# Patient Record
Sex: MALE | Race: BLACK OR AFRICAN AMERICAN | NOT HISPANIC OR LATINO | Employment: STUDENT | URBAN - METROPOLITAN AREA
[De-identification: names, ages, dates, MRNs, and addresses within clinical notes are randomized per-mention and may not be internally consistent; named-entity substitution may affect disease eponyms.]

---
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
## 2024-03-14 ENCOUNTER — OFFICE VISIT (OUTPATIENT)
Dept: PEDIATRICS | Facility: CLINIC | Age: 19
End: 2024-03-14
Payer: MEDICAID

## 2024-03-14 VITALS
HEART RATE: 93 BPM | RESPIRATION RATE: 20 BRPM | DIASTOLIC BLOOD PRESSURE: 75 MMHG | HEIGHT: 68 IN | TEMPERATURE: 98 F | OXYGEN SATURATION: 99 % | BODY MASS INDEX: 26.9 KG/M2 | WEIGHT: 177.5 LBS | SYSTOLIC BLOOD PRESSURE: 119 MMHG

## 2024-03-14 DIAGNOSIS — F90.2 ADHD (ATTENTION DEFICIT HYPERACTIVITY DISORDER), COMBINED TYPE: ICD-10-CM

## 2024-03-14 DIAGNOSIS — Z00.00 ENCOUNTER FOR WELL ADULT EXAM WITHOUT ABNORMAL FINDINGS: Primary | ICD-10-CM

## 2024-03-14 DIAGNOSIS — F84.0 AUTISM SPECTRUM DISORDER: ICD-10-CM

## 2024-03-14 DIAGNOSIS — G47.00 PERSISTENT DISORDER OF INITIATING OR MAINTAINING SLEEP: ICD-10-CM

## 2024-03-14 DIAGNOSIS — H10.10 ALLERGIC CONJUNCTIVITIS, UNSPECIFIED LATERALITY: ICD-10-CM

## 2024-03-14 DIAGNOSIS — J30.2 SEASONAL ALLERGIC RHINITIS, UNSPECIFIED TRIGGER: ICD-10-CM

## 2024-03-14 PROCEDURE — 3074F SYST BP LT 130 MM HG: CPT | Mod: CPTII,,, | Performed by: NURSE PRACTITIONER

## 2024-03-14 PROCEDURE — 1159F MED LIST DOCD IN RCRD: CPT | Mod: CPTII,,, | Performed by: NURSE PRACTITIONER

## 2024-03-14 PROCEDURE — 3008F BODY MASS INDEX DOCD: CPT | Mod: CPTII,,, | Performed by: NURSE PRACTITIONER

## 2024-03-14 PROCEDURE — 3078F DIAST BP <80 MM HG: CPT | Mod: CPTII,,, | Performed by: NURSE PRACTITIONER

## 2024-03-14 PROCEDURE — 99395 PREV VISIT EST AGE 18-39: CPT | Mod: 25,S$PBB,, | Performed by: NURSE PRACTITIONER

## 2024-03-14 PROCEDURE — 99213 OFFICE O/P EST LOW 20 MIN: CPT | Mod: PBBFAC,PN | Performed by: NURSE PRACTITIONER

## 2024-03-14 RX ORDER — DEXTROAMPHETAMINE SACCHARATE, AMPHETAMINE ASPARTATE, DEXTROAMPHETAMINE SULFATE AND AMPHETAMINE SULFATE 7.5; 7.5; 7.5; 7.5 MG/1; MG/1; MG/1; MG/1
30 TABLET ORAL 2 TIMES DAILY
Qty: 60 TABLET | Refills: 0 | Status: SHIPPED | OUTPATIENT
Start: 2024-03-14 | End: 2024-06-13 | Stop reason: SDUPTHER

## 2024-03-14 RX ORDER — LISDEXAMFETAMINE DIMESYLATE 70 MG/1
70 CAPSULE ORAL EVERY MORNING
Qty: 30 CAPSULE | Refills: 0 | Status: SHIPPED | OUTPATIENT
Start: 2024-03-14 | End: 2024-06-13 | Stop reason: SDUPTHER

## 2024-03-14 RX ORDER — CLONIDINE HYDROCHLORIDE 0.2 MG/1
TABLET ORAL
Qty: 60 TABLET | Refills: 5 | Status: SHIPPED | OUTPATIENT
Start: 2024-03-14 | End: 2024-06-13 | Stop reason: SDUPTHER

## 2024-03-14 RX ORDER — OLOPATADINE HYDROCHLORIDE 1 MG/ML
1 SOLUTION/ DROPS OPHTHALMIC 2 TIMES DAILY PRN
Qty: 5 ML | Refills: 2 | Status: SHIPPED | OUTPATIENT
Start: 2024-03-14 | End: 2024-06-13 | Stop reason: SDUPTHER

## 2024-03-14 RX ORDER — DEXTROAMPHETAMINE SACCHARATE, AMPHETAMINE ASPARTATE, DEXTROAMPHETAMINE SULFATE AND AMPHETAMINE SULFATE 7.5; 7.5; 7.5; 7.5 MG/1; MG/1; MG/1; MG/1
30 TABLET ORAL DAILY
Qty: 30 TABLET | Refills: 0 | Status: SHIPPED | OUTPATIENT
Start: 2024-03-14 | End: 2024-06-13 | Stop reason: SDUPTHER

## 2024-03-14 RX ORDER — CLONAZEPAM 1 MG/1
1.5 TABLET ORAL NIGHTLY
Qty: 45 TABLET | Refills: 2 | Status: SHIPPED | OUTPATIENT
Start: 2024-03-14 | End: 2024-06-13 | Stop reason: SDUPTHER

## 2024-03-14 RX ORDER — FLUTICASONE PROPIONATE 50 MCG
1 SPRAY, SUSPENSION (ML) NASAL DAILY
Qty: 16 G | Refills: 2 | Status: SHIPPED | OUTPATIENT
Start: 2024-03-14 | End: 2024-06-13 | Stop reason: SDUPTHER

## 2024-03-14 NOTE — PROGRESS NOTES
Chief Complaint   Patient presents with    Cough     X 3days    Follow-up     Present with Mom. Here for 3mo f/u check-up. Need some med refills.           HPI:  Carley is here with his mother for routine follow up Autism, ADHD and insomnia. He is due for his annual wellness check  Any changes last visit? no    Concerns today? Coughing x 3 days. Has been spitting out a lot of mucous. Has itchy eyes. No fevers. Continues to be active and eat well  The allergen outlook today:   EXTREMELY HIGH: dust and dander  HIGH in tree pollen and mold  MODERATE: grass pollen    Discussed use Flonase and Cetirizine daily during the spring allergy season. Added Olopatadine eye drops for allergy eye symptoms    Educational setting:  in 12th grade at [employer]  Parents have option of keeping him in school an extra year  Accommodations? yes, has IEP and 504. Self contained autism class and attend M-F  Academics/grades: doing well    Conduct at school: good  Conduct at home: good     Are current medications working well? yes  How long do the medicines last during the day? Sufficient duration  Are medications are being taken regularly according to parent? yes     Last fill dates for Vyvanse and Adderall: 2/8/24    Activity level: can be very active, and his impulsive activity is improved with use of Vyvanse and Adderall  Plays outside when the weather is nice, otherwise stays in.      Rehabilitation services: [facility]  Self help skills: feeds himself, occasionally dresses himself. No problems with getting ready for school.  Brushes his own teeth  Toilet training: yes     Diet/ Appetite: wonderful, eats anything and everything    Sleep: sleeping well; good response to Clonidine and Clonazepam.    Mood: Happy. No irritability or aggression     Brushes teeth: 2 times/day - Carley LOVES to brush his teeth and will use anyone's tooth brush. He will squeeze toothpaste into his mouth and his mother has to hide it  Sees dentist regularly?  "Yes. Sometimes he has to get some sedation to allow examination     Any vision/eye problems? no    Safety:  Wears seat belt/stays in car seat every time rides in car? yes  Does family have/practice fire escape plan, smoke detectors? yes         Reviewed and no changes:  Communication: Can use some words to express needs  Self injurious behavior: None. When he was younger, he would bite his hands if he is angry or frustrated, but hasn't done that in a long time    Aggression: none recently  Can be encinas; will slam his bedroom door several times if he is mad, so that everyone knows he is mad    Tantrums: no  Elopement problems: May leave his mother while shopping to go to restroom in store, but isn't running off    Ambulation: no problems    Rehabilitation services (OT,PT,ST, APE) Continues to receive ST, APE, OT at school    Recent hospitalization? no    Impaired vision? no  Impaired hearing? no    Assistive technology:   HC Plate yes  Wheel chair: no  Lift: no  Bath chair:no    Self Help skills:   Feeding: feeds himself  Bathing - yes, needs help/full assistance  Oral hygiene - yes, needs help/full assistance  Toileting/incontinence? needs partial assist with wiping. Mother says this hasn't changed    Toilet training: yes  Constipation: no     Meds tried:  Sertraline, Lexapro - no improvement  Fluoxetine - mother doesn't remember  Venlafaxine - possible sleep issues  Seroquel - for sleep, no benefit  Olanzapine - no improvement     Review of Systems   Gen: No fever, fatigue or malaise  Eyes: Red, itchy eyes  Nose: Nasal congestion and mucous  Mouth: No sore throat  Resp: No cough or wheezing  GI: No stomach aches  Skin: No rashes  Neuro: No headaches    Vitals:    03/14/24 0919   BP: 119/75   BP Location: Left arm   Patient Position: Sitting   BP Method: Medium (Automatic)   Pulse: 93   Resp: 20   Temp: 97.9 °F (36.6 °C)   TempSrc: Temporal   SpO2: 99%   Weight: 80.5 kg (177 lb 7.5 oz)   Height: 5' 7.72" (1.72 m) "         Physical Exam  General: Alert, social and cooperative. No verbalizations. Appears to be very happy and active.  Skin: Warm, dry, no rash.  Eye: Pupils are equal, round and reactive to light. Bilateral conjunctiva erythematous, worse on left. No discharge.  Ears: Bilateral TMs clear.  Nose: Turbinates very boggy. Moderate clear nasal discharge.  Mouth and throat: Oral mucosa moist, no pharyngeal erythema or exudate. Posterior cobblestoning  Neck: Supple. No lymphadenopathy.  Respiratory: Lungs are clear to auscultation, breath sounds are equal  Cardiovascular: Regular rate and rhythm. No murmur.  Gastrointestinal: Soft, non-tender, normal bowel sounds.  Genitourinary: Deferred  Back: Normal alignment.  Musculoskeletal: Moves all extremities. Normal strength  Neurologic: Alert, no focal neurological deficit observed. Normal and symmetrical reflexes observed.  Developmental: Healthy happy autistic young adult  BMI: 27.2    Assessment/Plan:  Encounter for well adult exam without abnormal findings  Comments:  Healthy, well nourished autistic young adult    ADHD (attention deficit hyperactivity disorder), combined type  Comments:  Good response to Vyvanse and Adderall IR  Orders:  -     lisdexamfetamine (VYVANSE) 70 MG capsule; Take 1 capsule (70 mg total) by mouth every morning. Fill in May  Dispense: 30 capsule; Refill: 0  -     lisdexamfetamine (VYVANSE) 70 MG capsule; Take 1 capsule (70 mg total) by mouth every morning. Fill in April  Dispense: 30 capsule; Refill: 0  -     VYVANSE 70 mg capsule; Take 1 capsule (70 mg total) by mouth every morning. Fill in March  Dispense: 30 capsule; Refill: 0  -     dextroamphetamine-amphetamine (ADDERALL) 30 mg Tab; Take 1 tablet (30 mg total) by mouth 2 (two) times daily. Take in AM and at noon/lunch. Fill in May  Dispense: 60 tablet; Refill: 0  -     dextroamphetamine-amphetamine (ADDERALL) 30 mg Tab; Take 1 tablet (30 mg total) by mouth 2 (two) times daily. Take in AM and  at noon/lunch. Fill in April  Dispense: 60 tablet; Refill: 0  -     dextroamphetamine-amphetamine (ADDERALL) 30 mg Tab; Take 1 tablet (30 mg total) by mouth 2 (two) times daily. Take in AM and at noon/lunch. Fill in March  Dispense: 60 tablet; Refill: 0  -     dextroamphetamine-amphetamine (ADDERALL) 30 mg Tab; Take 1 tablet (30 mg total) by mouth Daily. Take in afternoon. Family will pay, do not run insurance. Fill in May  Dispense: 30 tablet; Refill: 0  -     dextroamphetamine-amphetamine (ADDERALL) 30 mg Tab; Take 1 tablet (30 mg total) by mouth Daily. Take in afternoon. Family will pay, do not run insurance. Fill in April  Dispense: 30 tablet; Refill: 0  -     dextroamphetamine-amphetamine (ADDERALL) 30 mg Tab; Take 1 tablet (30 mg total) by mouth Daily. Take in afternoon. Do not run insurance, family will pay. Fill in March  Dispense: 30 tablet; Refill: 0  -     cloNIDine (CATAPRES) 0.2 MG tablet; TAKE 1 TO 2 TABLETS BY MOUTH AT BEDTIME AS NEEDED FOR SLEEP  Dispense: 60 tablet; Refill: 5    Autism spectrum disorder  Comments:  In SPED class    Persistent disorder of initiating or maintaining sleep  Comments:  Good response to Clonidine and Clonazepam  Orders:  -     cloNIDine (CATAPRES) 0.2 MG tablet; TAKE 1 TO 2 TABLETS BY MOUTH AT BEDTIME AS NEEDED FOR SLEEP  Dispense: 60 tablet; Refill: 5  -     clonazePAM (KLONOPIN) 1 MG tablet; Take 1.5 tablets (1.5 mg total) by mouth every evening. For sleep. May take with Clonidine  Dispense: 45 tablet; Refill: 2    Seasonal allergic rhinitis, unspecified trigger  Comments:  Good response to Cetirizine and Flonase.  Orders:  -     fluticasone propionate (FLONASE) 50 mcg/actuation nasal spray; 1 spray (50 mcg total) by Each Nostril route Daily. For stuffy or runny nose  Dispense: 16 g; Refill: 2    Allergic conjunctivitis, unspecified laterality  Comments:  Added Olopatadine eye drops  Orders:  -     olopatadine (PATANOL) 0.1 % ophthalmic solution; Place 1 drop into both  eyes 2 (two) times daily as needed (For red, itchy eyes).  Dispense: 5 mL; Refill: 2      Added Olopatadine eye drops as needed for red, itchy eyes from allergies  Continue all other medications as directed. 3 months refills sent for ADHD meds. Use allergy meds daily during spring allergy season  Follow up 3 months

## 2024-03-14 NOTE — LETTER
March 14, 2024    Carley Rodgers  [address]  [facility] [address]             [facility]  Pediatrics  [address]  [facility] [address]  Phone: [phone]  Fax: [phone]   March 14, 2024     Patient: Carley Rodgers   YOB: 2005   Date of Visit: 3/14/2024       To Whom it May Concern:    Please excuse Carley from school today for clinic visit.    If you have any questions or concerns, please don't hesitate to call.    Sincerely,         Carol Vaca, ISABELLEP

## 2024-03-14 NOTE — PATIENT INSTRUCTIONS
Added Olopatadine eye drops as needed for red, itchy eyes from allergies    Continue all other medications as directed    Follow up 3 months

## 2024-06-13 ENCOUNTER — OFFICE VISIT (OUTPATIENT)
Dept: PEDIATRICS | Facility: CLINIC | Age: 19
End: 2024-06-13
Payer: MEDICAID

## 2024-06-13 VITALS
SYSTOLIC BLOOD PRESSURE: 127 MMHG | BODY MASS INDEX: 27.26 KG/M2 | WEIGHT: 179.88 LBS | HEIGHT: 68 IN | RESPIRATION RATE: 20 BRPM | HEART RATE: 98 BPM | DIASTOLIC BLOOD PRESSURE: 85 MMHG | TEMPERATURE: 98 F | OXYGEN SATURATION: 99 %

## 2024-06-13 DIAGNOSIS — J30.2 SEASONAL ALLERGIC RHINITIS, UNSPECIFIED TRIGGER: ICD-10-CM

## 2024-06-13 DIAGNOSIS — F90.2 ADHD (ATTENTION DEFICIT HYPERACTIVITY DISORDER), COMBINED TYPE: Primary | ICD-10-CM

## 2024-06-13 DIAGNOSIS — H10.10 ALLERGIC CONJUNCTIVITIS, UNSPECIFIED LATERALITY: ICD-10-CM

## 2024-06-13 DIAGNOSIS — G47.00 PERSISTENT DISORDER OF INITIATING OR MAINTAINING SLEEP: ICD-10-CM

## 2024-06-13 PROCEDURE — 3074F SYST BP LT 130 MM HG: CPT | Mod: CPTII,,, | Performed by: NURSE PRACTITIONER

## 2024-06-13 PROCEDURE — 99213 OFFICE O/P EST LOW 20 MIN: CPT | Mod: S$PBB,,, | Performed by: NURSE PRACTITIONER

## 2024-06-13 PROCEDURE — 3079F DIAST BP 80-89 MM HG: CPT | Mod: CPTII,,, | Performed by: NURSE PRACTITIONER

## 2024-06-13 PROCEDURE — 3008F BODY MASS INDEX DOCD: CPT | Mod: CPTII,,, | Performed by: NURSE PRACTITIONER

## 2024-06-13 PROCEDURE — 99214 OFFICE O/P EST MOD 30 MIN: CPT | Mod: PBBFAC,PN | Performed by: NURSE PRACTITIONER

## 2024-06-13 PROCEDURE — 1159F MED LIST DOCD IN RCRD: CPT | Mod: CPTII,,, | Performed by: NURSE PRACTITIONER

## 2024-06-13 RX ORDER — DEXTROAMPHETAMINE SACCHARATE, AMPHETAMINE ASPARTATE, DEXTROAMPHETAMINE SULFATE AND AMPHETAMINE SULFATE 7.5; 7.5; 7.5; 7.5 MG/1; MG/1; MG/1; MG/1
30 TABLET ORAL 2 TIMES DAILY
Qty: 60 TABLET | Refills: 0 | Status: SHIPPED | OUTPATIENT
Start: 2024-06-13

## 2024-06-13 RX ORDER — CLONIDINE HYDROCHLORIDE 0.2 MG/1
TABLET ORAL
Qty: 60 TABLET | Refills: 5 | Status: SHIPPED | OUTPATIENT
Start: 2024-06-13

## 2024-06-13 RX ORDER — LISDEXAMFETAMINE DIMESYLATE 70 MG/1
70 CAPSULE ORAL EVERY MORNING
Qty: 30 CAPSULE | Refills: 0 | Status: SHIPPED | OUTPATIENT
Start: 2024-06-13

## 2024-06-13 RX ORDER — CETIRIZINE HYDROCHLORIDE 10 MG/1
10 TABLET ORAL DAILY
Qty: 30 TABLET | Refills: 5 | Status: SHIPPED | OUTPATIENT
Start: 2024-06-13

## 2024-06-13 RX ORDER — CLONAZEPAM 1 MG/1
1.5 TABLET ORAL NIGHTLY
Qty: 45 TABLET | Refills: 2 | Status: SHIPPED | OUTPATIENT
Start: 2024-06-13

## 2024-06-13 RX ORDER — DEXTROAMPHETAMINE SACCHARATE, AMPHETAMINE ASPARTATE, DEXTROAMPHETAMINE SULFATE AND AMPHETAMINE SULFATE 7.5; 7.5; 7.5; 7.5 MG/1; MG/1; MG/1; MG/1
30 TABLET ORAL DAILY
Qty: 30 TABLET | Refills: 0 | Status: SHIPPED | OUTPATIENT
Start: 2024-06-13

## 2024-06-13 RX ORDER — OLOPATADINE HYDROCHLORIDE 1 MG/ML
1 SOLUTION/ DROPS OPHTHALMIC 2 TIMES DAILY PRN
Qty: 5 ML | Refills: 2 | Status: SHIPPED | OUTPATIENT
Start: 2024-06-13

## 2024-06-13 RX ORDER — FLUTICASONE PROPIONATE 50 MCG
1 SPRAY, SUSPENSION (ML) NASAL DAILY
Qty: 16 G | Refills: 2 | Status: SHIPPED | OUTPATIENT
Start: 2024-06-13

## 2024-06-13 NOTE — PROGRESS NOTES
Chief Complaint   Patient presents with    3mo f/u for ADHD     Present with Mom. UTD vaccines. Need med refills.       HPI:  Carley is here with his mother for routine follow up Autism, ADHD and insomnia.   Any changes last visit? no    Interim history:  Graduated from high school! He attended his graduation though they didn't stay long as there were many people there  Mother is looking for an educational work type program to keep him occupied now that school is over    Educational setting:  graduated 12th grade at [employer]  Mother will be contacting [employer] [employer] about programs    Conduct at home: good      Are current medications working well? yes  How long do the medicines last during the day? Sufficient duration  Are medications are being taken regularly according to parent? yes      Last fill dates for Vyvanse and Adderall: 5/10/24     Activity level: can be very active, and his impulsive activity is improved with use of Vyvanse and Adderall  Plays outside when the weather is nice, otherwise stays in.      Rehabilitation services: [facility]  Self help skills: feeds himself, occasionally dresses himself. No problems with getting ready for school.  Brushes his own teeth  Toilet training: yes     Diet/ Appetite: wonderful, eats anything and everything     Sleep: sleeping well; good response to Clonidine and Clonazepam.     Mood: Happy. No irritability or aggression     Brushes teeth: 2 times/day - Carley LOVES to brush his teeth and will use anyone's tooth brush. He will squeeze toothpaste into his mouth and his mother has to hide it  Sees dentist regularly? Yes. Sometimes he has to get some sedation to allow examination     Any vision/eye problems? no     Safety:  Wears seat belt/stays in car seat every time rides in car? yes  Does family have/practice fire escape plan, smoke detectors? yes         Reviewed and no changes:  Communication: Can use some words to express needs  Self injurious behavior: None. When he was  "younger, he would bite his hands if he is angry or frustrated, but hasn't done that in a long time    Aggression: none recently  Can be encinas; will slam his bedroom door several times if he is mad, so that everyone knows he is mad    Tantrums: no  Elopement problems: May leave his mother while shopping to go to restroom in store, but isn't running off    Ambulation: no problems    Rehabilitation services (OT,PT,ST, APE) Continues to receive ST, APE, OT at school    Recent hospitalization? no    Impaired vision? no  Impaired hearing? no    Assistive technology:   HC Plate yes  Wheel chair: no  Lift: no  Bath chair:no    Self Help skills:   Feeding: feeds himself  Bathing - yes, needs help/full assistance  Oral hygiene - yes, needs help/full assistance  Toileting/incontinence? needs partial assist with wiping. Mother says this hasn't changed    Toilet training: yes  Constipation: no     Meds tried:  Sertraline, Lexapro - no improvement  Fluoxetine - mother doesn't remember  Venlafaxine - possible sleep issues  Seroquel - for sleep, no benefit  Olanzapine - no improvement    Review of Systems   Gen: No fever, fatigue or malaise  Nose: No nasal congestion  Mouth: No sore throat  Resp: No cough or wheezing  CVS: No chest pain or palpitations  GI: No stomach aches  Neuro: No headaches    Vitals:    06/13/24 0849   BP: 127/85   Pulse: 98   Resp: 20   Temp: 97.7 °F (36.5 °C)   SpO2: 99%   Weight: 81.6 kg (179 lb 14.3 oz)   Height: 5' 7.52" (1.715 m)     Physical Exam:  General: Alert, happy and cooperative. Smiling. No verbalizations today  Skin: Warm, dry, no rash  Eye: Pupils are equal, round and reactive to light. Normal conjunctiva, no discharge.  Nose: No nasal discharge.  Mouth and throat: Oral mucosa moist. No pharyngeal erythema or exudate.  Respiratory: Lungs are clear to auscultation, breath sounds are equal  Cardiovascular: Regular rate and rhythm. No murmur.  Gastrointestinal: Abd soft, non tender. Normal bowel " sounds  Neurologic: Alert, no focal neurological deficit observed.    Assessment/Plan:  ADHD (attention deficit hyperactivity disorder), combined type  Comments:  Good response to Vyvanse and Adderall IR  Orders:  -     dextroamphetamine-amphetamine (ADDERALL) 30 mg Tab; Take 1 tablet (30 mg total) by mouth 2 (two) times daily. Take in AM and at noon/lunch. Fill in August  Dispense: 60 tablet; Refill: 0  -     dextroamphetamine-amphetamine (ADDERALL) 30 mg Tab; Take 1 tablet (30 mg total) by mouth 2 (two) times daily. Take in AM and at noon/lunch. Fill in July  Dispense: 60 tablet; Refill: 0  -     dextroamphetamine-amphetamine (ADDERALL) 30 mg Tab; Take 1 tablet (30 mg total) by mouth 2 (two) times daily. Take in AM and at noon/lunch. Fill in June  Dispense: 60 tablet; Refill: 0  -     dextroamphetamine-amphetamine (ADDERALL) 30 mg Tab; Take 1 tablet (30 mg total) by mouth Daily. Take in afternoon. Family will pay, do not run insurance. Fill in August  Dispense: 30 tablet; Refill: 0  -     dextroamphetamine-amphetamine (ADDERALL) 30 mg Tab; Take 1 tablet (30 mg total) by mouth Daily. Take in afternoon. Family will pay, do not run insurance. Fill in July  Dispense: 30 tablet; Refill: 0  -     dextroamphetamine-amphetamine (ADDERALL) 30 mg Tab; Take 1 tablet (30 mg total) by mouth Daily. Take in afternoon. Do not run insurance, family will pay. Fill in June  Dispense: 30 tablet; Refill: 0  -     lisdexamfetamine (VYVANSE) 70 MG capsule; Take 1 capsule (70 mg total) by mouth every morning. Fill in August  Dispense: 30 capsule; Refill: 0  -     lisdexamfetamine (VYVANSE) 70 MG capsule; Take 1 capsule (70 mg total) by mouth every morning. Fill in July  Dispense: 30 capsule; Refill: 0  -     VYVANSE 70 mg capsule; Take 1 capsule (70 mg total) by mouth every morning. Fill in June  Dispense: 30 capsule; Refill: 0  -     cloNIDine (CATAPRES) 0.2 MG tablet; TAKE 1 TO 2 TABLETS BY MOUTH AT BEDTIME AS NEEDED FOR SLEEP   Dispense: 60 tablet; Refill: 5    Persistent disorder of initiating or maintaining sleep  Comments:  Good response to Clonidine and Clonazepam  Orders:  -     cloNIDine (CATAPRES) 0.2 MG tablet; TAKE 1 TO 2 TABLETS BY MOUTH AT BEDTIME AS NEEDED FOR SLEEP  Dispense: 60 tablet; Refill: 5  -     clonazePAM (KLONOPIN) 1 MG tablet; Take 1.5 tablets (1.5 mg total) by mouth every evening. For sleep. May take with Clonidine  Dispense: 45 tablet; Refill: 2    Allergic conjunctivitis, unspecified laterality  Comments:  Added Olopatadine eye drops  Orders:  -     olopatadine (PATANOL) 0.1 % ophthalmic solution; Place 1 drop into both eyes 2 (two) times daily as needed (For red, itchy eyes).  Dispense: 5 mL; Refill: 2    Seasonal allergic rhinitis, unspecified trigger  Comments:  Good response to Cetirizine and Flonase.  Orders:  -     cetirizine (ZYRTEC) 10 MG tablet; Take 1 tablet (10 mg total) by mouth once daily. For allergy symptoms  Dispense: 30 tablet; Refill: 5  -     fluticasone propionate (FLONASE) 50 mcg/actuation nasal spray; 1 spray (50 mcg total) by Each Nostril route Daily. For stuffy or runny nose  Dispense: 16 g; Refill: 2    Seasonal allergic rhinitis, unspecified trigger  Comments:  Duplicate dx  Orders:  -     cetirizine (ZYRTEC) 10 MG tablet; Take 1 tablet (10 mg total) by mouth once daily. For allergy symptoms  Dispense: 30 tablet; Refill: 5  -     fluticasone propionate (FLONASE) 50 mcg/actuation nasal spray; 1 spray (50 mcg total) by Each Nostril route Daily. For stuffy or runny nose  Dispense: 16 g; Refill: 2    Continue current medications as directed  Follow up 3 months

## 2024-07-12 ENCOUNTER — TELEPHONE (OUTPATIENT)
Dept: PEDIATRICS | Facility: CLINIC | Age: 19
End: 2024-07-12
Payer: MEDICAID

## 2024-07-12 DIAGNOSIS — F90.2 ADHD (ATTENTION DEFICIT HYPERACTIVITY DISORDER), COMBINED TYPE: ICD-10-CM

## 2024-07-12 RX ORDER — DEXTROAMPHETAMINE SACCHARATE, AMPHETAMINE ASPARTATE, DEXTROAMPHETAMINE SULFATE AND AMPHETAMINE SULFATE 7.5; 7.5; 7.5; 7.5 MG/1; MG/1; MG/1; MG/1
30 TABLET ORAL 2 TIMES DAILY
Qty: 60 TABLET | Refills: 0 | Status: SHIPPED | OUTPATIENT
Start: 2024-07-12

## 2024-07-16 ENCOUNTER — TELEPHONE (OUTPATIENT)
Dept: PEDIATRICS | Facility: CLINIC | Age: 19
End: 2024-07-16
Payer: MEDICAID

## 2024-07-16 DIAGNOSIS — F90.2 ADHD (ATTENTION DEFICIT HYPERACTIVITY DISORDER), COMBINED TYPE: ICD-10-CM

## 2024-07-17 RX ORDER — LISDEXAMFETAMINE DIMESYLATE 70 MG/1
70 CAPSULE ORAL EVERY MORNING
Qty: 30 CAPSULE | Refills: 0 | Status: SHIPPED | OUTPATIENT
Start: 2024-07-17

## 2024-07-17 NOTE — TELEPHONE ENCOUNTER
"Carol, I call [facility] and they are requesting another script for the Vyvanse.  States it needs to have "brand name medically necessary"  "

## 2024-08-28 ENCOUNTER — TELEPHONE (OUTPATIENT)
Dept: PEDIATRICS | Facility: CLINIC | Age: 19
End: 2024-08-28
Payer: MEDICAID

## 2024-08-28 DIAGNOSIS — F90.2 ADHD (ATTENTION DEFICIT HYPERACTIVITY DISORDER), COMBINED TYPE: ICD-10-CM

## 2024-08-29 RX ORDER — LISDEXAMFETAMINE DIMESYLATE 70 MG/1
70 CAPSULE ORAL EVERY MORNING
Qty: 30 CAPSULE | Refills: 0 | Status: SHIPPED | OUTPATIENT
Start: 2024-08-29

## 2024-09-12 ENCOUNTER — TELEPHONE (OUTPATIENT)
Dept: PEDIATRICS | Facility: CLINIC | Age: 19
End: 2024-09-12
Payer: MEDICAID

## 2024-09-12 DIAGNOSIS — H10.13 ALLERGIC CONJUNCTIVITIS OF BOTH EYES: Primary | ICD-10-CM

## 2024-09-12 RX ORDER — AZELASTINE HYDROCHLORIDE 0.5 MG/ML
1 SOLUTION/ DROPS OPHTHALMIC 2 TIMES DAILY PRN
Qty: 6 ML | Refills: 1 | Status: SHIPPED | OUTPATIENT
Start: 2024-09-12

## 2024-09-12 NOTE — TELEPHONE ENCOUNTER
Carol, the Patanol is getting rejected at the pharmacy.  I tried submitting a PA for brand and generic and the message I'm getting is PA Case ID #: [id number]  Need Help? Call us at [phone]  Outcome  Additional Information Required  OLOPATADINE HCL 0.1 % DROPS is not covered for this Beneficiary. For covered alternatives, please view the preferred drug list at [email or web address] or call us at [phone].

## 2024-09-12 NOTE — TELEPHONE ENCOUNTER
This has come up several times and I have been sending in Azelastine eye drops in the place of Patanol. I sent in a prescription for Carley

## 2024-09-13 ENCOUNTER — OFFICE VISIT (OUTPATIENT)
Dept: PEDIATRICS | Facility: CLINIC | Age: 19
End: 2024-09-13
Payer: MEDICAID

## 2024-09-13 VITALS
SYSTOLIC BLOOD PRESSURE: 122 MMHG | DIASTOLIC BLOOD PRESSURE: 76 MMHG | TEMPERATURE: 98 F | BODY MASS INDEX: 28.14 KG/M2 | WEIGHT: 190 LBS | HEART RATE: 68 BPM | RESPIRATION RATE: 18 BRPM | HEIGHT: 69 IN

## 2024-09-13 DIAGNOSIS — J30.2 SEASONAL ALLERGIC RHINITIS, UNSPECIFIED TRIGGER: ICD-10-CM

## 2024-09-13 DIAGNOSIS — Z23 IMMUNIZATION DUE: ICD-10-CM

## 2024-09-13 DIAGNOSIS — G47.00 PERSISTENT DISORDER OF INITIATING OR MAINTAINING SLEEP: ICD-10-CM

## 2024-09-13 DIAGNOSIS — F90.2 ADHD (ATTENTION DEFICIT HYPERACTIVITY DISORDER), COMBINED TYPE: Primary | ICD-10-CM

## 2024-09-13 PROCEDURE — 99213 OFFICE O/P EST LOW 20 MIN: CPT | Mod: PBBFAC,PN | Performed by: NURSE PRACTITIONER

## 2024-09-13 RX ORDER — DEXTROAMPHETAMINE SACCHARATE, AMPHETAMINE ASPARTATE, DEXTROAMPHETAMINE SULFATE AND AMPHETAMINE SULFATE 7.5; 7.5; 7.5; 7.5 MG/1; MG/1; MG/1; MG/1
30 TABLET ORAL 2 TIMES DAILY
Qty: 60 TABLET | Refills: 0 | Status: SHIPPED | OUTPATIENT
Start: 2024-09-13

## 2024-09-13 RX ORDER — LISDEXAMFETAMINE DIMESYLATE 70 MG/1
70 CAPSULE ORAL EVERY MORNING
Qty: 30 CAPSULE | Refills: 0 | Status: SHIPPED | OUTPATIENT
Start: 2024-09-13

## 2024-09-13 RX ORDER — FLUTICASONE PROPIONATE 50 MCG
1 SPRAY, SUSPENSION (ML) NASAL DAILY
Qty: 16 G | Refills: 2 | Status: SHIPPED | OUTPATIENT
Start: 2024-09-13

## 2024-09-13 RX ORDER — CLONIDINE HYDROCHLORIDE 0.2 MG/1
TABLET ORAL
Qty: 60 TABLET | Refills: 5 | Status: SHIPPED | OUTPATIENT
Start: 2024-09-13

## 2024-09-13 RX ORDER — DEXTROAMPHETAMINE SACCHARATE, AMPHETAMINE ASPARTATE, DEXTROAMPHETAMINE SULFATE AND AMPHETAMINE SULFATE 7.5; 7.5; 7.5; 7.5 MG/1; MG/1; MG/1; MG/1
30 TABLET ORAL DAILY
Qty: 30 TABLET | Refills: 0 | Status: SHIPPED | OUTPATIENT
Start: 2024-09-13

## 2024-09-13 RX ORDER — CLONAZEPAM 1 MG/1
1.5 TABLET ORAL NIGHTLY
Qty: 45 TABLET | Refills: 2 | Status: SHIPPED | OUTPATIENT
Start: 2024-09-13

## 2024-09-13 RX ORDER — CETIRIZINE HYDROCHLORIDE 10 MG/1
10 TABLET ORAL DAILY
Qty: 30 TABLET | Refills: 5 | Status: SHIPPED | OUTPATIENT
Start: 2024-09-13

## 2024-09-13 NOTE — PROGRESS NOTES
Chief Complaint   Patient presents with    Follow-up     Here for follow up for Autism, ADHD and several other disorders. Mom has no concern at this time. Needs flu vaccine     HPI:  Carley is here with his mother and grandmother for routine follow up Autism, ADHD and insomnia.   Any changes last visit? no    Interim history:  Will start with [facility] in October. Mother may bring him or he can take the bus.     Educational setting:  graduated from [employer]     Conduct at home: good      Are current medications working well? yes  How long do the medicines last during the day? Sufficient duration  Are medications are being taken regularly according to parent? yes      Last fill for Vyvanse: 8/29/24   Last fill for Adderall: 8/13/24     Activity level: can be very active, and his impulsive activity is improved with use of Vyvanse and Adderall  Plays outside when the weather is nice, otherwise stays in.      Self help skills: feeds himself, occasionally dresses himself. No problems with getting ready for school.  Brushes his own teeth  Toilet training: yes     Diet/ Appetite: wonderful, eats anything and everything     Sleep: sleeping well; good response to Clonidine and Clonazepam.     Mood: Happy. No irritability or aggression     Brushes teeth: 2 times/day - Carley LOVES to brush his teeth and will use anyone's tooth brush. He will squeeze toothpaste into his mouth and his mother has to hide it  Sees dentist regularly? Yes. Sometimes he has to get some sedation to allow examination     Any vision/eye problems? no     Safety:  Wears seat belt/stays in car seat every time rides in car? yes  Does family have/practice fire escape plan, smoke detectors? yes         Reviewed and no changes:  Communication: Can use some words to express needs  Self injurious behavior: None. When he was younger, he would bite his hands if he is angry or frustrated, but hasn't done that in a long time    Aggression: none recently  Can be  "encinas; will slam his bedroom door several times if he is mad, so that everyone knows he is mad    Tantrums: no  Elopement problems: May leave his mother while shopping to go to restroom in store, but isn't running off    Ambulation: no problems    Rehabilitation services (OT,PT,ST, APE): no    Recent hospitalization? no    Impaired vision? no  Impaired hearing? no    Assistive technology:   HC Plate yes  Wheel chair: no  Lift: no  Bath chair:no    Self Help skills:   Feeding: feeds himself  Bathing - yes, needs help/full assistance  Oral hygiene - yes, needs help/full assistance  Toileting/incontinence? needs partial assist with wiping. Mother says this hasn't changed    Toilet training: yes  Constipation: no     Meds tried:  Sertraline, Lexapro - no improvement  Fluoxetine - mother doesn't remember  Venlafaxine - possible sleep issues  Seroquel - for sleep, no benefit  Olanzapine - no improvement    Review of Systems   Gen: No fever, fatigue or malaise  Nose: No nasal congestion  Mouth: No sore throat  Resp: No cough or wheezing  CVS: No chest pain or palpitations  GI: No stomach aches  Neuro: No headaches    Vitals:    09/13/24 0921   BP: 122/76   Pulse: 68   Resp: 18   Temp: 97.7 °F (36.5 °C)   Weight: 86.2 kg (190 lb)   Height: 5' 9.29" (1.76 m)     Physical Exam:  General: Alert, appropriate for age. Pleasant and cooperative. Listens to his mother's instructions. Loves to wash his hands and he stopped when his mother told him to  Skin: Warm, dry, no rash  Eye: Pupils are equal, round and reactive to light. Normal conjunctiva, no discharge.  Ears: Bilateral TMs clear  Nose: No nasal discharge.  Mouth and throat: Oral mucosa moist. No pharyngeal erythema or exudate.  Respiratory: Lungs are clear to auscultation, breath sounds are equal  Cardiovascular: Regular rate and rhythm. No murmur.  Gastrointestinal: Abd soft, normal bowel sounds  Neurologic: Alert, no focal neurological deficit " observed.      Assessment/Plan:  ADHD (attention deficit hyperactivity disorder), combined type  Comments:  Good response to Vyvanse and Adderall IR  Orders:  -     dextroamphetamine-amphetamine (ADDERALL) 30 mg Tab; Take 1 tablet (30 mg total) by mouth 2 (two) times daily. Take in AM and at noon/lunch. Fill in November  Dispense: 60 tablet; Refill: 0  -     dextroamphetamine-amphetamine (ADDERALL) 30 mg Tab; Take 1 tablet (30 mg total) by mouth 2 (two) times daily. Take in AM and at noon/lunch. Fill in October  Dispense: 60 tablet; Refill: 0  -     dextroamphetamine-amphetamine (ADDERALL) 30 mg Tab; Take 1 tablet (30 mg total) by mouth Daily. Take in afternoon. Family will pay, do not run insurance. Fill in September  Dispense: 30 tablet; Refill: 0  -     dextroamphetamine-amphetamine (ADDERALL) 30 mg Tab; Take 1 tablet (30 mg total) by mouth Daily. Take in afternoon. Family will pay, do not run insurance. Fill in October  Dispense: 30 tablet; Refill: 0  -     dextroamphetamine-amphetamine (ADDERALL) 30 mg Tab; Take 1 tablet (30 mg total) by mouth Daily. Take in afternoon. Do not run insurance, family will pay. Fill in November  Dispense: 30 tablet; Refill: 0  -     dextroamphetamine-amphetamine (ADDERALL) 30 mg Tab; Take 1 tablet (30 mg total) by mouth 2 (two) times daily. Take in AM and at noon/lunch. Fill in September  Dispense: 60 tablet; Refill: 0  -     lisdexamfetamine (VYVANSE) 70 MG capsule; Take 1 capsule (70 mg total) by mouth every morning. Fill in November. Name Brand Medically Necessary  Dispense: 30 capsule; Refill: 0  -     VYVANSE 70 mg capsule; Take 1 capsule (70 mg total) by mouth every morning. Fill in October. Name Brand Medically Necessary  Dispense: 30 capsule; Refill: 0  -     VYVANSE 70 mg capsule; Take 1 capsule (70 mg total) by mouth every morning. Fill in September. Name Brand Medically Necessary  Dispense: 30 capsule; Refill: 0  -     cloNIDine (CATAPRES) 0.2 MG tablet; TAKE 1 TO 2  TABLETS BY MOUTH AT BEDTIME AS NEEDED FOR SLEEP  Dispense: 60 tablet; Refill: 5    Persistent disorder of initiating or maintaining sleep  Comments:  Good response to Clonidine and Clonazepam  Orders:  -     cloNIDine (CATAPRES) 0.2 MG tablet; TAKE 1 TO 2 TABLETS BY MOUTH AT BEDTIME AS NEEDED FOR SLEEP  Dispense: 60 tablet; Refill: 5  -     clonazePAM (KLONOPIN) 1 MG tablet; Take 1.5 tablets (1.5 mg total) by mouth every evening. For sleep. May take with Clonidine  Dispense: 45 tablet; Refill: 2    Seasonal allergic rhinitis, unspecified trigger  Comments:  Good response to Cetirizine and Flonase.  Orders:  -     cetirizine (ZYRTEC) 10 MG tablet; Take 1 tablet (10 mg total) by mouth once daily. For allergy symptoms  Dispense: 30 tablet; Refill: 5  -     fluticasone propionate (FLONASE) 50 mcg/actuation nasal spray; 1 spray (50 mcg total) by Each Nostril route Daily. For stuffy or runny nose  Dispense: 16 g; Refill: 2    Immunization due  -     Discontinue: influenza (Flulaval, Fluzone, Fluarix) 45 mcg/0.5 mL IM vaccine (> or = 6 mo) 0.5 mL  -     influenza (Flulaval, Fluzone, Fluarix) 45 mcg/0.5 mL IM vaccine (> or = 6 mo) 0.5 mL    Seasonal allergic rhinitis, unspecified trigger  Comments:  Duplicate dx  Orders:  -     cetirizine (ZYRTEC) 10 MG tablet; Take 1 tablet (10 mg total) by mouth once daily. For allergy symptoms  Dispense: 30 tablet; Refill: 5  -     fluticasone propionate (FLONASE) 50 mcg/actuation nasal spray; 1 spray (50 mcg total) by Each Nostril route Daily. For stuffy or runny nose  Dispense: 16 g; Refill: 2    Continue current medications as directed  Follow up 3 months

## 2024-09-26 ENCOUNTER — OFFICE VISIT (OUTPATIENT)
Dept: PEDIATRICS | Facility: CLINIC | Age: 19
End: 2024-09-26
Payer: MEDICAID

## 2024-09-26 ENCOUNTER — TELEPHONE (OUTPATIENT)
Dept: PEDIATRICS | Facility: CLINIC | Age: 19
End: 2024-09-26

## 2024-09-26 VITALS
RESPIRATION RATE: 16 BRPM | HEART RATE: 67 BPM | DIASTOLIC BLOOD PRESSURE: 85 MMHG | WEIGHT: 191.38 LBS | OXYGEN SATURATION: 97 % | BODY MASS INDEX: 27.4 KG/M2 | TEMPERATURE: 97 F | SYSTOLIC BLOOD PRESSURE: 125 MMHG | HEIGHT: 70 IN

## 2024-09-26 DIAGNOSIS — R23.4 FISSURE IN SKIN OF RIGHT FOOT: Primary | ICD-10-CM

## 2024-09-26 DIAGNOSIS — F84.0 AUTISM SPECTRUM DISORDER: ICD-10-CM

## 2024-09-26 PROCEDURE — 99213 OFFICE O/P EST LOW 20 MIN: CPT | Mod: S$PBB,,, | Performed by: NURSE PRACTITIONER

## 2024-09-26 PROCEDURE — 3074F SYST BP LT 130 MM HG: CPT | Mod: CPTII,,, | Performed by: NURSE PRACTITIONER

## 2024-09-26 PROCEDURE — 1159F MED LIST DOCD IN RCRD: CPT | Mod: CPTII,,, | Performed by: NURSE PRACTITIONER

## 2024-09-26 PROCEDURE — 3008F BODY MASS INDEX DOCD: CPT | Mod: CPTII,,, | Performed by: NURSE PRACTITIONER

## 2024-09-26 PROCEDURE — 99215 OFFICE O/P EST HI 40 MIN: CPT | Mod: PBBFAC,PN | Performed by: NURSE PRACTITIONER

## 2024-09-26 PROCEDURE — 3079F DIAST BP 80-89 MM HG: CPT | Mod: CPTII,,, | Performed by: NURSE PRACTITIONER

## 2024-09-26 RX ORDER — CEPHALEXIN 500 MG/1
500 CAPSULE ORAL EVERY 12 HOURS
Qty: 14 CAPSULE | Refills: 0 | Status: SHIPPED | OUTPATIENT
Start: 2024-09-26 | End: 2024-10-03

## 2024-09-26 RX ORDER — MUPIROCIN 20 MG/G
OINTMENT TOPICAL 2 TIMES DAILY
Qty: 22 G | Refills: 0 | Status: SHIPPED | OUTPATIENT
Start: 2024-09-26

## 2024-09-26 NOTE — PROGRESS NOTES
"SUBJECTIVE:  Carley Rodgers is a 19 y.o. male  Here with mother for c/o cut on rt foot ("Since yesterday" Afebrile. )    HPI  Carley is here with his mother for a large split callus on the ball of his right foot  Yesterday mother noticed that Carley had taken some hydrogen peroxide to the bathroom so she went to see what he was doing. He was trying to clean a large fissure in the callus of his right forefoot  Carley walks on his tip-toes and has   Alfredas allergies, medications, history, and problem list were updated as appropriate.    Review of Systems   Constitutional:  Negative for activity change and appetite change.   HENT: Negative.     Eyes: Negative.    Respiratory: Negative.     Cardiovascular: Negative.    Skin:         Rt dorsal forefoot: full separation of middle of callus into dermis. No erythema, bleeding or discharge. No erythema. Able to bear weight. Patient is a toe walker and has very thick calluses on the ball of his feet and on his toes, eddie his right great toe. Pictures in chart   All other systems reviewed and are negative.     A comprehensive review of symptoms was completed and negative except as noted above.    OBJECTIVE:  Vital signs  Vitals:    09/26/24 0959   BP: 125/85   Pulse: 67   Resp: 16   Temp: 97.2 °F (36.2 °C)   SpO2: 97%   Weight: 86.8 kg (191 lb 5.8 oz)   Height: 5' 10.08" (1.78 m)        Physical Exam  Vitals reviewed.   Constitutional:       General: He is not in acute distress.     Appearance: Normal appearance.   Skin:     Capillary Refill: Capillary refill takes less than 2 seconds.      Comments: Rt foot: large separation of mid callus on ball of foot, extends to epidermis. Measuring approx . No erythema, discharge or c/o pain. Pt has significant calluses on his toes, eddie his great toes, due to persistent toe walking.    Neurological:      General: No focal deficit present.      Mental Status: He is alert.          ASSESSMENT/PLAN:  1. Fissure in skin of right " foot  -     cephALEXin (KEFLEX) 500 MG capsule; Take 1 capsule (500 mg total) by mouth every 12 (twelve) hours. for 7 days  Dispense: 14 capsule; Refill: 0  -     mupirocin (BACTROBAN) 2 % ointment; Apply topically 2 (two) times daily. Soak right foot in warm water, pat dry and apply thin coating until wound is healed  Dispense: 22 g; Refill: 0  -     Ambulatory referral/consult to Podiatry; Future; Expected date: 10/03/2024    2. Autism spectrum disorder  -     Ambulatory referral/consult to Podiatry; Future; Expected date: 10/03/2024    Added Cephalexin BID x 7 days  Added Mupirocin ointment apply BID after soaking foot (to soften callus)  Referral to Podiatry     No results found for this or any previous visit (from the past 24 hours).    Follow Up:  No follow-ups on file.

## 2024-12-10 ENCOUNTER — OFFICE VISIT (OUTPATIENT)
Dept: PEDIATRICS | Facility: CLINIC | Age: 19
End: 2024-12-10
Payer: MEDICAID

## 2024-12-10 VITALS
HEART RATE: 60 BPM | BODY MASS INDEX: 28.61 KG/M2 | SYSTOLIC BLOOD PRESSURE: 129 MMHG | TEMPERATURE: 97 F | DIASTOLIC BLOOD PRESSURE: 90 MMHG | OXYGEN SATURATION: 100 % | HEIGHT: 68 IN | RESPIRATION RATE: 16 BRPM | WEIGHT: 188.81 LBS

## 2024-12-10 DIAGNOSIS — R23.4 FISSURE IN SKIN OF RIGHT FOOT: ICD-10-CM

## 2024-12-10 DIAGNOSIS — J30.2 SEASONAL ALLERGIC RHINITIS, UNSPECIFIED TRIGGER: ICD-10-CM

## 2024-12-10 DIAGNOSIS — G47.00 PERSISTENT DISORDER OF INITIATING OR MAINTAINING SLEEP: ICD-10-CM

## 2024-12-10 DIAGNOSIS — H10.13 ALLERGIC CONJUNCTIVITIS OF BOTH EYES: ICD-10-CM

## 2024-12-10 DIAGNOSIS — F90.2 ADHD (ATTENTION DEFICIT HYPERACTIVITY DISORDER), COMBINED TYPE: ICD-10-CM

## 2024-12-10 PROCEDURE — 1159F MED LIST DOCD IN RCRD: CPT | Mod: CPTII,,, | Performed by: NURSE PRACTITIONER

## 2024-12-10 PROCEDURE — 99213 OFFICE O/P EST LOW 20 MIN: CPT | Mod: PBBFAC,PN | Performed by: NURSE PRACTITIONER

## 2024-12-10 PROCEDURE — 3080F DIAST BP >= 90 MM HG: CPT | Mod: CPTII,,, | Performed by: NURSE PRACTITIONER

## 2024-12-10 PROCEDURE — 99213 OFFICE O/P EST LOW 20 MIN: CPT | Mod: S$PBB,,, | Performed by: NURSE PRACTITIONER

## 2024-12-10 PROCEDURE — 3074F SYST BP LT 130 MM HG: CPT | Mod: CPTII,,, | Performed by: NURSE PRACTITIONER

## 2024-12-10 PROCEDURE — 3008F BODY MASS INDEX DOCD: CPT | Mod: CPTII,,, | Performed by: NURSE PRACTITIONER

## 2024-12-10 RX ORDER — DEXTROAMPHETAMINE SACCHARATE, AMPHETAMINE ASPARTATE, DEXTROAMPHETAMINE SULFATE AND AMPHETAMINE SULFATE 7.5; 7.5; 7.5; 7.5 MG/1; MG/1; MG/1; MG/1
30 TABLET ORAL DAILY
Qty: 30 TABLET | Refills: 0 | Status: SHIPPED | OUTPATIENT
Start: 2024-12-10 | End: 2025-01-09

## 2024-12-10 RX ORDER — CETIRIZINE HYDROCHLORIDE 10 MG/1
10 TABLET ORAL DAILY
Qty: 30 TABLET | Refills: 5 | Status: SHIPPED | OUTPATIENT
Start: 2024-12-10

## 2024-12-10 RX ORDER — DEXTROAMPHETAMINE SACCHARATE, AMPHETAMINE ASPARTATE, DEXTROAMPHETAMINE SULFATE AND AMPHETAMINE SULFATE 7.5; 7.5; 7.5; 7.5 MG/1; MG/1; MG/1; MG/1
30 TABLET ORAL DAILY
Qty: 30 TABLET | Refills: 0 | Status: SHIPPED | OUTPATIENT
Start: 2025-01-10 | End: 2025-02-09

## 2024-12-10 RX ORDER — LISDEXAMFETAMINE DIMESYLATE 70 MG/1
70 CAPSULE ORAL EVERY MORNING
Qty: 30 CAPSULE | Refills: 0 | Status: SHIPPED | OUTPATIENT
Start: 2024-12-10 | End: 2025-01-09

## 2024-12-10 RX ORDER — DEXTROAMPHETAMINE SACCHARATE, AMPHETAMINE ASPARTATE, DEXTROAMPHETAMINE SULFATE AND AMPHETAMINE SULFATE 7.5; 7.5; 7.5; 7.5 MG/1; MG/1; MG/1; MG/1
30 TABLET ORAL 2 TIMES DAILY
Qty: 60 TABLET | Refills: 0 | Status: SHIPPED | OUTPATIENT
Start: 2024-12-10 | End: 2025-01-09

## 2024-12-10 RX ORDER — CLONAZEPAM 1 MG/1
1.5 TABLET ORAL NIGHTLY
Qty: 45 TABLET | Refills: 2 | Status: SHIPPED | OUTPATIENT
Start: 2024-12-10

## 2024-12-10 RX ORDER — DEXTROAMPHETAMINE SACCHARATE, AMPHETAMINE ASPARTATE, DEXTROAMPHETAMINE SULFATE AND AMPHETAMINE SULFATE 7.5; 7.5; 7.5; 7.5 MG/1; MG/1; MG/1; MG/1
30 TABLET ORAL 2 TIMES DAILY
Qty: 60 TABLET | Refills: 0 | Status: SHIPPED | OUTPATIENT
Start: 2025-02-10 | End: 2025-03-12

## 2024-12-10 RX ORDER — LISDEXAMFETAMINE DIMESYLATE 70 MG/1
70 CAPSULE ORAL EVERY MORNING
Qty: 30 CAPSULE | Refills: 0 | Status: SHIPPED | OUTPATIENT
Start: 2025-02-10 | End: 2025-03-12

## 2024-12-10 RX ORDER — CLONIDINE HYDROCHLORIDE 0.2 MG/1
TABLET ORAL
Qty: 60 TABLET | Refills: 5 | Status: SHIPPED | OUTPATIENT
Start: 2024-12-10

## 2024-12-10 RX ORDER — DEXTROAMPHETAMINE SACCHARATE, AMPHETAMINE ASPARTATE, DEXTROAMPHETAMINE SULFATE AND AMPHETAMINE SULFATE 7.5; 7.5; 7.5; 7.5 MG/1; MG/1; MG/1; MG/1
30 TABLET ORAL 2 TIMES DAILY
Qty: 60 TABLET | Refills: 0 | Status: SHIPPED | OUTPATIENT
Start: 2025-01-10 | End: 2025-02-09

## 2024-12-10 RX ORDER — LISDEXAMFETAMINE DIMESYLATE 70 MG/1
70 CAPSULE ORAL EVERY MORNING
Qty: 30 CAPSULE | Refills: 0 | Status: SHIPPED | OUTPATIENT
Start: 2025-01-10 | End: 2025-02-09

## 2024-12-10 RX ORDER — AZELASTINE HYDROCHLORIDE 0.5 MG/ML
1 SOLUTION/ DROPS OPHTHALMIC 2 TIMES DAILY PRN
Qty: 6 ML | Refills: 1 | Status: SHIPPED | OUTPATIENT
Start: 2024-12-10

## 2024-12-10 RX ORDER — DEXTROAMPHETAMINE SACCHARATE, AMPHETAMINE ASPARTATE, DEXTROAMPHETAMINE SULFATE AND AMPHETAMINE SULFATE 7.5; 7.5; 7.5; 7.5 MG/1; MG/1; MG/1; MG/1
30 TABLET ORAL DAILY
Qty: 30 TABLET | Refills: 0 | Status: SHIPPED | OUTPATIENT
Start: 2025-02-10 | End: 2025-03-12

## 2024-12-10 RX ORDER — MUPIROCIN 20 MG/G
OINTMENT TOPICAL 2 TIMES DAILY
Qty: 22 G | Refills: 0 | Status: SHIPPED | OUTPATIENT
Start: 2024-12-10

## 2024-12-10 NOTE — PROGRESS NOTES
Chief Complaint   Patient presents with    Here with mom for f/u & med refills.      Meds work well. No concerns. UTD vaccines.      HPI:  Carley is here with his mother for routine follow up Autism, ADHD and insomnia.   Any changes last visit? no    Interim history:  No recent ER or urgent care visits     Educational setting:  graduated from [employer]     Conduct at home: good      Are current medications working well? yes  How long do the medicines last during the day? Sufficient duration  Are medications are being taken regularly according to parent? yes      Last fill for Vyvanse: 11/25/24   Last fill for Adderall: 11/11/24     Activity level: can be very active, and his impulsive activity is improved with use of Vyvanse and Adderall  Plays outside when the weather is nice, otherwise stays in.      Self help skills: feeds himself, occasionally dresses himself. No problems with getting ready for school.  Brushes his own teeth  Toilet training: yes     Diet/ Appetite: wonderful, eats anything and everything     Sleep: sleeping well; good response to Clonidine and Clonazepam.     Mood: Happy. No irritability or aggression     Brushes teeth: 2 times/day - Carley LOVES to brush his teeth and will use anyone's tooth brush. He will squeeze toothpaste into his mouth and his mother has to hide it  Sees dentist regularly? Yes. Sometimes he has to get some sedation to allow examination     Any vision/eye problems? no     Safety:  Wears seat belt/stays in car seat every time rides in car? yes  Does family have/practice fire escape plan, smoke detectors? yes         Reviewed and no changes:  Communication: Can use some words to express needs  Self injurious behavior: None. When he was younger, he would bite his hands if he is angry or frustrated, but hasn't done that in a long time    Aggression: none recently  Can be encinas; will slam his bedroom door several times if he is mad, so that everyone knows he is  mad    Tantrums: no  Elopement problems: May leave his mother while shopping to go to restroom in store, but isn't running off    Ambulation: no problems    Rehabilitation services (OT,PT,ST, APE): no    Recent hospitalization? no    Impaired vision? no  Impaired hearing? no    Assistive technology:   HC Plate yes  Wheel chair: no  Lift: no  Bath chair:no    Self Help skills:   Feeding: feeds himself  Bathing - yes, needs help/full assistance  Oral hygiene - yes, needs help/full assistance  Toileting/incontinence? needs partial assist with wiping. Mother says this hasn't changed    Toilet training: yes  Constipation: no     Meds tried:  Sertraline, Lexapro - no improvement  Fluoxetine - mother doesn't remember  Venlafaxine - possible sleep issues  Seroquel - for sleep, no benefit  Olanzapine - no improvement    Review of Systems   Gen: No illness/malaise/fatigue  Resp: No cough or wheezing  CVS: No chest pain or palpitations  GI: No stomach aches  Neuro: No headaches    Vitals:    12/10/24 0903   BP: (!) 129/90   Pulse:    Resp:    Temp:      Physical Exam:  General: Alert, social and cooperative. No verbalizations today.  Respiratory: Lungs are clear to auscultation, breath sounds are equal  Cardiovascular: Regular rate and rhythm. No murmur.  Neurologic: Alert, no focal neurological deficit observed.    Assessment/Plan:  ADHD (attention deficit hyperactivity disorder), combined type  Comments:  Good response to Vyvanse and Adderall IR  Orders:  -     cloNIDine (CATAPRES) 0.2 MG tablet; TAKE 1 TO 2 TABLETS BY MOUTH AT BEDTIME AS NEEDED FOR SLEEP  Dispense: 60 tablet; Refill: 5  -     lisdexamfetamine (VYVANSE) 70 MG capsule; Take 1 capsule (70 mg total) by mouth every morning. Name Brand Medically Necessary  Dispense: 30 capsule; Refill: 0  -     VYVANSE 70 mg capsule; Take 1 capsule (70 mg total) by mouth every morning. Name Brand Medically Necessary  Dispense: 30 capsule; Refill: 0  -     VYVANSE 70 mg capsule;  Take 1 capsule (70 mg total) by mouth every morning. Name Brand Medically Necessary  Dispense: 30 capsule; Refill: 0  -     dextroamphetamine-amphetamine (ADDERALL) 30 mg Tab; Take 1 tablet (30 mg total) by mouth 2 (two) times daily. Take in AM and at noon/lunch.  Dispense: 60 tablet; Refill: 0  -     dextroamphetamine-amphetamine (ADDERALL) 30 mg Tab; Take 1 tablet (30 mg total) by mouth 2 (two) times daily. Take in AM and at noon/lunch.  Dispense: 60 tablet; Refill: 0  -     dextroamphetamine-amphetamine (ADDERALL) 30 mg Tab; Take 1 tablet (30 mg total) by mouth 2 (two) times daily. Take in AM and at noon/lunch.  Dispense: 60 tablet; Refill: 0  -     dextroamphetamine-amphetamine (ADDERALL) 30 mg Tab; Take 1 tablet (30 mg total) by mouth Daily. Take in afternoon. Family will pay, do not run insurance.  Dispense: 30 tablet; Refill: 0  -     dextroamphetamine-amphetamine (ADDERALL) 30 mg Tab; Take 1 tablet (30 mg total) by mouth Daily. Take in afternoon. Family will pay, do not run insurance.  Dispense: 30 tablet; Refill: 0  -     dextroamphetamine-amphetamine (ADDERALL) 30 mg Tab; Take 1 tablet (30 mg total) by mouth Daily. Take in afternoon. Do not run insurance, family will pay.  Dispense: 30 tablet; Refill: 0    Persistent disorder of initiating or maintaining sleep  Comments:  Good response to Clonidine and Clonazepam  Orders:  -     cloNIDine (CATAPRES) 0.2 MG tablet; TAKE 1 TO 2 TABLETS BY MOUTH AT BEDTIME AS NEEDED FOR SLEEP  Dispense: 60 tablet; Refill: 5  -     clonazePAM (KLONOPIN) 1 MG tablet; Take 1.5 tablets (1.5 mg total) by mouth every evening. For sleep. May take with Clonidine  Dispense: 45 tablet; Refill: 2    Allergic conjunctivitis of both eyes  -     azelastine (OPTIVAR) 0.05 % ophthalmic solution; Place 1 drop into both eyes 2 (two) times daily as needed (for red, itchy, allergy eyes).  Dispense: 6 mL; Refill: 1    Fissure in skin of right foot  -     mupirocin (BACTROBAN) 2 % ointment; Apply  topically 2 (two) times daily. Soak right foot in warm water, pat dry and apply thin coating until wound is healed  Dispense: 22 g; Refill: 0    Seasonal allergic rhinitis, unspecified trigger  Comments:  Duplicate dx  Orders:  -     cetirizine (ZYRTEC) 10 MG tablet; Take 1 tablet (10 mg total) by mouth once daily. For allergy symptoms  Dispense: 30 tablet; Refill: 5    Continue current medications as directed  Follow up 3 months

## 2025-03-11 ENCOUNTER — OFFICE VISIT (OUTPATIENT)
Dept: PEDIATRICS | Facility: CLINIC | Age: 20
End: 2025-03-11
Payer: MEDICAID

## 2025-03-11 ENCOUNTER — TELEPHONE (OUTPATIENT)
Dept: PEDIATRICS | Facility: CLINIC | Age: 20
End: 2025-03-11
Payer: MEDICAID

## 2025-03-11 DIAGNOSIS — F90.2 ADHD (ATTENTION DEFICIT HYPERACTIVITY DISORDER), COMBINED TYPE: ICD-10-CM

## 2025-03-11 DIAGNOSIS — G47.00 PERSISTENT DISORDER OF INITIATING OR MAINTAINING SLEEP: ICD-10-CM

## 2025-03-11 DIAGNOSIS — J30.2 SEASONAL ALLERGIC RHINITIS, UNSPECIFIED TRIGGER: ICD-10-CM

## 2025-03-11 RX ORDER — DEXTROAMPHETAMINE SACCHARATE, AMPHETAMINE ASPARTATE, DEXTROAMPHETAMINE SULFATE AND AMPHETAMINE SULFATE 7.5; 7.5; 7.5; 7.5 MG/1; MG/1; MG/1; MG/1
30 TABLET ORAL DAILY
Qty: 30 TABLET | Refills: 0 | Status: SHIPPED | OUTPATIENT
Start: 2025-04-11 | End: 2025-05-11

## 2025-03-11 RX ORDER — DEXTROAMPHETAMINE SACCHARATE, AMPHETAMINE ASPARTATE, DEXTROAMPHETAMINE SULFATE AND AMPHETAMINE SULFATE 7.5; 7.5; 7.5; 7.5 MG/1; MG/1; MG/1; MG/1
30 TABLET ORAL DAILY
Qty: 30 TABLET | Refills: 0 | Status: SHIPPED | OUTPATIENT
Start: 2025-03-11 | End: 2025-04-10

## 2025-03-11 RX ORDER — DEXTROAMPHETAMINE SACCHARATE, AMPHETAMINE ASPARTATE, DEXTROAMPHETAMINE SULFATE AND AMPHETAMINE SULFATE 7.5; 7.5; 7.5; 7.5 MG/1; MG/1; MG/1; MG/1
30 TABLET ORAL DAILY
Qty: 30 TABLET | Refills: 0 | Status: SHIPPED | OUTPATIENT
Start: 2025-05-11 | End: 2025-06-10

## 2025-03-11 RX ORDER — CLONIDINE HYDROCHLORIDE 0.2 MG/1
TABLET ORAL
Qty: 60 TABLET | Refills: 5 | Status: SHIPPED | OUTPATIENT
Start: 2025-03-11

## 2025-03-11 RX ORDER — DEXTROAMPHETAMINE SACCHARATE, AMPHETAMINE ASPARTATE, DEXTROAMPHETAMINE SULFATE AND AMPHETAMINE SULFATE 7.5; 7.5; 7.5; 7.5 MG/1; MG/1; MG/1; MG/1
30 TABLET ORAL 2 TIMES DAILY
Qty: 60 TABLET | Refills: 0 | Status: SHIPPED | OUTPATIENT
Start: 2025-03-11 | End: 2025-04-10

## 2025-03-11 RX ORDER — CLONAZEPAM 1 MG/1
1.5 TABLET ORAL NIGHTLY
Qty: 45 TABLET | Refills: 2 | Status: SHIPPED | OUTPATIENT
Start: 2025-03-11

## 2025-03-11 RX ORDER — CETIRIZINE HYDROCHLORIDE 10 MG/1
10 TABLET ORAL DAILY
Qty: 30 TABLET | Refills: 5 | Status: SHIPPED | OUTPATIENT
Start: 2025-03-11

## 2025-03-11 RX ORDER — LISDEXAMFETAMINE DIMESYLATE 70 MG/1
70 CAPSULE ORAL EVERY MORNING
Qty: 30 CAPSULE | Refills: 0 | Status: SHIPPED | OUTPATIENT
Start: 2025-03-11 | End: 2025-04-10

## 2025-03-11 RX ORDER — DEXTROAMPHETAMINE SACCHARATE, AMPHETAMINE ASPARTATE, DEXTROAMPHETAMINE SULFATE AND AMPHETAMINE SULFATE 7.5; 7.5; 7.5; 7.5 MG/1; MG/1; MG/1; MG/1
30 TABLET ORAL 2 TIMES DAILY
Qty: 60 TABLET | Refills: 0 | Status: SHIPPED | OUTPATIENT
Start: 2025-04-11 | End: 2025-05-11

## 2025-03-11 RX ORDER — DEXTROAMPHETAMINE SACCHARATE, AMPHETAMINE ASPARTATE, DEXTROAMPHETAMINE SULFATE AND AMPHETAMINE SULFATE 7.5; 7.5; 7.5; 7.5 MG/1; MG/1; MG/1; MG/1
30 TABLET ORAL 2 TIMES DAILY
Qty: 60 TABLET | Refills: 0 | Status: SHIPPED | OUTPATIENT
Start: 2025-05-11 | End: 2025-06-10

## 2025-03-11 RX ORDER — LISDEXAMFETAMINE DIMESYLATE 70 MG/1
70 CAPSULE ORAL EVERY MORNING
Qty: 30 CAPSULE | Refills: 0 | Status: SHIPPED | OUTPATIENT
Start: 2025-04-11 | End: 2025-05-11

## 2025-03-11 RX ORDER — LISDEXAMFETAMINE DIMESYLATE 70 MG/1
70 CAPSULE ORAL EVERY MORNING
Qty: 30 CAPSULE | Refills: 0 | Status: SHIPPED | OUTPATIENT
Start: 2025-05-11 | End: 2025-06-10

## 2025-03-11 RX ORDER — FLUTICASONE PROPIONATE 50 MCG
1 SPRAY, SUSPENSION (ML) NASAL DAILY
Qty: 16 G | Refills: 2 | Status: SHIPPED | OUTPATIENT
Start: 2025-03-11

## 2025-03-11 NOTE — PROGRESS NOTES
The patient location is: [place], [place]. Home with his mother  The chief complaint leading to consultation is: routine follow up autism, ADHD and insomnia    Visit type: audiovisual    Face to Face time with patient: 5  7 minutes of total time spent on the encounter, which includes face to face time and non-face to face time preparing to see the patient (eg, review of tests), Obtaining and/or reviewing separately obtained history, Documenting clinical information in the electronic or other health record, Independently interpreting results (not separately reported) and communicating results to the patient/family/caregiver, or Care coordination (not separately reported).     Each patient to whom he or she provides medical services by telemedicine is:  (1) informed of the relationship between the physician and patient and the respective role of any other health care provider with respect to management of the patient; and (2) notified that he or she may decline to receive medical services by telemedicine and may withdraw from such care at any time.    HPI:  Carley at home with his mother for routine follow up Autism, ADHD and insomnia.   Any changes last visit? no    Interim history:  No recent ER or urgent care visits     Educational setting:  graduated from [employer] last year     Conduct at home: good      Are current medications working well? yes  How long do the medicines last during the day? Sufficient duration  Are medications are being taken regularly according to parent? yes      Last fill for Vyvanse: 2/17/25  Last fill for Adderall: 2/10/25     Activity level: can be very active, and his impulsive activity is improved with use of Vyvanse and Adderall  Plays outside when the weather is nice, otherwise stays in.      Self help skills: feeds himself, occasionally dresses himself. No problems with getting ready for school.  Brushes his own teeth  Toilet training: yes     Diet/ Appetite: wonderful, eats anything  and everything     Sleep: sleeping well; good response to Clonidine and Clonazepam.     Mood: Happy. No irritability or aggression     Brushes teeth: 2 times/day - Carley LOVES to brush his teeth and will use anyone's tooth brush. He will squeeze toothpaste into his mouth and his mother has to hide it  Sees dentist regularly? Yes. Sometimes he has to get some sedation to allow examination     Any vision/eye problems? no     Safety:  Wears seat belt/stays in car seat every time rides in car? yes  Does family have/practice fire escape plan, smoke detectors? yes         Reviewed and no changes:  Communication: Can use some words to express needs  Self injurious behavior: None. When he was younger, he would bite his hands if he is angry or frustrated, but hasn't done that in a long time    Aggression: none recently  Can be encinas; will slam his bedroom door several times if he is mad, so that everyone knows he is mad    Tantrums: no  Elopement problems: May leave his mother while shopping to go to restroom in store, but isn't running off    Ambulation: no problems    Rehabilitation services (OT,PT,ST, APE): no    Recent hospitalization? no    Impaired vision? no  Impaired hearing? no    Assistive technology:   HC Plate yes  Wheel chair: no  Lift: no  Bath chair:no    Self Help skills:   Feeding: feeds himself  Bathing - yes, needs help/full assistance  Oral hygiene - yes, needs help/full assistance  Toileting/incontinence? needs partial assist with wiping. Mother says this hasn't changed    Toilet training: yes  Constipation: no     Meds tried:  Sertraline, Lexapro - no improvement  Fluoxetine - mother doesn't remember  Venlafaxine - possible sleep issues  Seroquel - for sleep, no benefit  Olanzapine - no improvement    Review of Systems   Gen: No fever, fatigue or malaise  Nose: No nasal congestion  Mouth: No sore throat  Resp: No cough or wheezing  CVS: No chest pain or palpitations  GI: No stomach aches  Neuro: No  headaches    Review of Systems   Gen: No fever, fatigue or malaise  Nose: No nasal congestion  Mouth: No sore throat  Resp: No cough or wheezing  CVS: No chest pain or palpitations  GI: No stomach aches  Neuro: No headaches    Assessment/Plan:  ADHD (attention deficit hyperactivity disorder), combined type  Comments:  Good response to Vyvanse and Adderall IR  Orders:  -     dextroamphetamine-amphetamine (ADDERALL) 30 mg Tab; Take 1 tablet (30 mg total) by mouth 2 (two) times daily. Take in AM and at noon/lunch.  Dispense: 60 tablet; Refill: 0  -     dextroamphetamine-amphetamine (ADDERALL) 30 mg Tab; Take 1 tablet (30 mg total) by mouth 2 (two) times daily. Take in AM and at noon/lunch.  Dispense: 60 tablet; Refill: 0  -     dextroamphetamine-amphetamine (ADDERALL) 30 mg Tab; Take 1 tablet (30 mg total) by mouth 2 (two) times daily. Take in AM and at noon/lunch.  Dispense: 60 tablet; Refill: 0  -     dextroamphetamine-amphetamine (ADDERALL) 30 mg Tab; Take 1 tablet (30 mg total) by mouth Daily. Take in afternoon. Family will pay, do not run insurance.  Dispense: 30 tablet; Refill: 0  -     dextroamphetamine-amphetamine (ADDERALL) 30 mg Tab; Take 1 tablet (30 mg total) by mouth Daily. Take in afternoon. Family will pay, do not run insurance.  Dispense: 30 tablet; Refill: 0  -     dextroamphetamine-amphetamine (ADDERALL) 30 mg Tab; Take 1 tablet (30 mg total) by mouth Daily. Take in afternoon. Family will pay, do not run insurance.  Dispense: 30 tablet; Refill: 0  -     dextroamphetamine-amphetamine (ADDERALL) 30 mg Tab; Take 1 tablet (30 mg total) by mouth Daily. Take in afternoon. Family will pay, do not run insurance.  Dispense: 30 tablet; Refill: 0  -     lisdexamfetamine (VYVANSE) 70 MG capsule; Take 1 capsule (70 mg total) by mouth every morning. Name Brand Medically Necessary  Dispense: 30 capsule; Refill: 0  -     lisdexamfetamine (VYVANSE) 70 MG capsule; Take 1 capsule (70 mg total) by mouth every morning.  Name Brand Medically Necessary  Dispense: 30 capsule; Refill: 0  -     lisdexamfetamine (VYVANSE) 70 MG capsule; Take 1 capsule (70 mg total) by mouth every morning. Name Brand Medically Necessary  Dispense: 30 capsule; Refill: 0  -     cloNIDine (CATAPRES) 0.2 MG tablet; TAKE 1 TO 2 TABLETS BY MOUTH AT BEDTIME AS NEEDED FOR SLEEP  Dispense: 60 tablet; Refill: 5    Persistent disorder of initiating or maintaining sleep  Comments:  Good response to Clonidine and Clonazepam  Orders:  -     cloNIDine (CATAPRES) 0.2 MG tablet; TAKE 1 TO 2 TABLETS BY MOUTH AT BEDTIME AS NEEDED FOR SLEEP  Dispense: 60 tablet; Refill: 5  -     clonazePAM (KLONOPIN) 1 MG tablet; Take 1.5 tablets (1.5 mg total) by mouth every evening. For sleep. May take with Clonidine  Dispense: 45 tablet; Refill: 2    Seasonal allergic rhinitis, unspecified trigger  -     cetirizine (ZYRTEC) 10 MG tablet; Take 1 tablet (10 mg total) by mouth once daily. For allergy symptoms  Dispense: 30 tablet; Refill: 5  -     fluticasone propionate (FLONASE) 50 mcg/actuation nasal spray; 1 spray (50 mcg total) by Each Nostril route Daily. For stuffy or runny nose  Dispense: 16 g; Refill: 2    Seasonal allergic rhinitis, unspecified trigger  Comments:  Good response to Cetirizine and Flonase.  Orders:  -     cetirizine (ZYRTEC) 10 MG tablet; Take 1 tablet (10 mg total) by mouth once daily. For allergy symptoms  Dispense: 30 tablet; Refill: 5  -     fluticasone propionate (FLONASE) 50 mcg/actuation nasal spray; 1 spray (50 mcg total) by Each Nostril route Daily. For stuffy or runny nose  Dispense: 16 g; Refill: 2    Continue current medications as directed  Follow up 3 months

## 2025-03-11 NOTE — TELEPHONE ENCOUNTER
----- Message from Asher sent at 3/11/2025  3:16 PM CDT -----  Regarding: Rx Refill  Mom says they are still waiting on his refills Patient Contact: [phone]: [facility] - [address] [place] [address]Aqznv: [phone] Fax: [phone]

## 2025-03-11 NOTE — LETTER
March 11, 2025    Carley Rodgers  [address] [address]             [facility]  Pediatrics  [address]  [facility] [address]  Phone: [phone]  Fax: [phone]   March 11, 2025     Patient: Carley Rodgers   YOB: 2005   Date of Visit: 3/11/2025       To Whom it May Concern:    Please excuse Carley's mother from work missed today to accompany Carley at his clinic visit.    If you have any questions or concerns, please don't hesitate to call.    Sincerely,         Carol Vaca, ISABELLEP

## 2025-03-11 NOTE — TELEPHONE ENCOUNTER
Spoke to mom and she received all the meds except the Adderall and the Vyvanse. Next I call the pharmacy , they did not receive these refills. But the Adderall can be fill now , once the script is sent. But the Vyvanse is 8 days too early. Next I call mom to let her know. . Please send refills on both meds. Thank you

## 2025-07-22 ENCOUNTER — OFFICE VISIT (OUTPATIENT)
Dept: PEDIATRICS | Facility: CLINIC | Age: 20
End: 2025-07-22
Payer: MEDICAID

## 2025-07-22 VITALS
RESPIRATION RATE: 18 BRPM | WEIGHT: 177.69 LBS | TEMPERATURE: 97 F | HEIGHT: 69 IN | DIASTOLIC BLOOD PRESSURE: 78 MMHG | SYSTOLIC BLOOD PRESSURE: 118 MMHG | BODY MASS INDEX: 26.32 KG/M2 | HEART RATE: 68 BPM

## 2025-07-22 DIAGNOSIS — F84.0 AUTISM SPECTRUM DISORDER: Primary | ICD-10-CM

## 2025-07-22 DIAGNOSIS — F90.2 ADHD (ATTENTION DEFICIT HYPERACTIVITY DISORDER), COMBINED TYPE: ICD-10-CM

## 2025-07-22 DIAGNOSIS — G47.00 PERSISTENT DISORDER OF INITIATING OR MAINTAINING SLEEP: ICD-10-CM

## 2025-07-22 PROCEDURE — 99214 OFFICE O/P EST MOD 30 MIN: CPT | Mod: S$PBB,,, | Performed by: NURSE PRACTITIONER

## 2025-07-22 PROCEDURE — 3078F DIAST BP <80 MM HG: CPT | Mod: CPTII,,, | Performed by: NURSE PRACTITIONER

## 2025-07-22 PROCEDURE — 99213 OFFICE O/P EST LOW 20 MIN: CPT | Mod: PBBFAC,PN | Performed by: NURSE PRACTITIONER

## 2025-07-22 PROCEDURE — 1159F MED LIST DOCD IN RCRD: CPT | Mod: CPTII,,, | Performed by: NURSE PRACTITIONER

## 2025-07-22 PROCEDURE — 3008F BODY MASS INDEX DOCD: CPT | Mod: CPTII,,, | Performed by: NURSE PRACTITIONER

## 2025-07-22 PROCEDURE — 3074F SYST BP LT 130 MM HG: CPT | Mod: CPTII,,, | Performed by: NURSE PRACTITIONER

## 2025-07-22 RX ORDER — LISDEXAMFETAMINE DIMESYLATE 70 MG/1
70 CAPSULE ORAL EVERY MORNING
Qty: 30 CAPSULE | Refills: 0 | Status: SHIPPED | OUTPATIENT
Start: 2025-07-22 | End: 2025-08-21

## 2025-07-22 RX ORDER — CLONIDINE HYDROCHLORIDE 0.2 MG/1
TABLET ORAL
Qty: 60 TABLET | Refills: 5 | Status: SHIPPED | OUTPATIENT
Start: 2025-07-22

## 2025-07-22 RX ORDER — DEXTROAMPHETAMINE SACCHARATE, AMPHETAMINE ASPARTATE, DEXTROAMPHETAMINE SULFATE AND AMPHETAMINE SULFATE 7.5; 7.5; 7.5; 7.5 MG/1; MG/1; MG/1; MG/1
30 TABLET ORAL DAILY
Qty: 30 TABLET | Refills: 0 | Status: SHIPPED | OUTPATIENT
Start: 2025-08-21 | End: 2025-09-20

## 2025-07-22 RX ORDER — LISDEXAMFETAMINE DIMESYLATE 70 MG/1
70 CAPSULE ORAL EVERY MORNING
Qty: 30 CAPSULE | Refills: 0 | Status: SHIPPED | OUTPATIENT
Start: 2025-09-21 | End: 2025-10-21

## 2025-07-22 RX ORDER — DEXTROAMPHETAMINE SACCHARATE, AMPHETAMINE ASPARTATE, DEXTROAMPHETAMINE SULFATE AND AMPHETAMINE SULFATE 7.5; 7.5; 7.5; 7.5 MG/1; MG/1; MG/1; MG/1
30 TABLET ORAL 2 TIMES DAILY
Qty: 60 TABLET | Refills: 0 | Status: SHIPPED | OUTPATIENT
Start: 2025-09-21 | End: 2025-10-21

## 2025-07-22 RX ORDER — DEXTROAMPHETAMINE SACCHARATE, AMPHETAMINE ASPARTATE, DEXTROAMPHETAMINE SULFATE AND AMPHETAMINE SULFATE 7.5; 7.5; 7.5; 7.5 MG/1; MG/1; MG/1; MG/1
30 TABLET ORAL DAILY
Qty: 30 TABLET | Refills: 0 | Status: SHIPPED | OUTPATIENT
Start: 2025-09-21 | End: 2025-10-21

## 2025-07-22 RX ORDER — DEXTROAMPHETAMINE SACCHARATE, AMPHETAMINE ASPARTATE, DEXTROAMPHETAMINE SULFATE AND AMPHETAMINE SULFATE 7.5; 7.5; 7.5; 7.5 MG/1; MG/1; MG/1; MG/1
30 TABLET ORAL 2 TIMES DAILY
Qty: 60 TABLET | Refills: 0 | Status: SHIPPED | OUTPATIENT
Start: 2025-08-21 | End: 2025-09-20

## 2025-07-22 RX ORDER — DEXTROAMPHETAMINE SACCHARATE, AMPHETAMINE ASPARTATE, DEXTROAMPHETAMINE SULFATE AND AMPHETAMINE SULFATE 7.5; 7.5; 7.5; 7.5 MG/1; MG/1; MG/1; MG/1
30 TABLET ORAL 2 TIMES DAILY
Qty: 60 TABLET | Refills: 0 | Status: SHIPPED | OUTPATIENT
Start: 2025-07-22 | End: 2025-08-21

## 2025-07-22 RX ORDER — DEXTROAMPHETAMINE SACCHARATE, AMPHETAMINE ASPARTATE, DEXTROAMPHETAMINE SULFATE AND AMPHETAMINE SULFATE 7.5; 7.5; 7.5; 7.5 MG/1; MG/1; MG/1; MG/1
30 TABLET ORAL DAILY
Qty: 30 TABLET | Refills: 0 | Status: SHIPPED | OUTPATIENT
Start: 2025-07-22 | End: 2025-08-21

## 2025-07-22 RX ORDER — LISDEXAMFETAMINE DIMESYLATE 70 MG/1
70 CAPSULE ORAL EVERY MORNING
Qty: 30 CAPSULE | Refills: 0 | Status: SHIPPED | OUTPATIENT
Start: 2025-08-21 | End: 2025-09-20

## 2025-07-22 RX ORDER — CLONAZEPAM 2 MG/1
2 TABLET ORAL NIGHTLY
Qty: 30 TABLET | Refills: 3 | Status: SHIPPED | OUTPATIENT
Start: 2025-07-22

## 2025-07-22 RX ORDER — OLOPATADINE HYDROCHLORIDE 1 MG/ML
SOLUTION OPHTHALMIC
COMMUNITY
Start: 2025-03-11

## 2025-07-22 NOTE — PROGRESS NOTES
Chief Complaint   Patient presents with    Follow-up     Here for follow up for ADHD and several other disorders. Mom has no concern at this time.     HPI:  Carley at home with his mother for routine follow up Autism, ADHD and insomnia.   Any changes last visit? no    Interim history:  No recent ER or urgent care visits     Educational setting:  had graduated from [employer]. Stays at home     Conduct at home: good      Are current medications working well? yes  How long do the medicines last during the day? Sufficient duration  Are medications are being taken regularly according to parent? yes      Last fill for Vyvanse: 7/22/25  Last fill for Adderall: 7/22/25     Activity level: can be very active, and his impulsive activity is improved with use of Vyvanse and Adderall  Plays outside when the weather is nice, otherwise stays in.      Self help skills: feeds himself, occasionally dresses himself. No problems with getting ready for school.  Brushes his own teeth  Toilet training: yes     Diet/ Appetite: wonderful, eats anything and everything     Sleep: sleeping well; good response to Clonidine and Clonazepam.     Mood: Happy. No irritability or aggression     Brushes teeth: 2 times/day - Carley LOVES to brush his teeth and will use anyone's tooth brush. He will squeeze toothpaste into his mouth and his mother has to hide it  Sees dentist regularly? Yes. Sometimes he has to get some sedation to allow examination     Any vision/eye problems? no     Safety:  Wears seat belt/stays in car seat every time rides in car? yes  Does family have/practice fire escape plan, smoke detectors? yes         Reviewed and no changes:  Communication: Can use some words to express needs  Self injurious behavior: None. When he was younger, he would bite his hands if he is angry or frustrated, but hasn't done that in a long time    Aggression: none recently  Can be encinas; will slam his bedroom door several times if he is mad, so that  "everyone knows he is mad    Tantrums: no  Elopement problems: May leave his mother while shopping to go to restroom in store, but isn't running off    Ambulation: no problems    Rehabilitation services (OT,PT,ST, APE): no    Recent hospitalization? no    Impaired vision? no  Impaired hearing? no    Assistive technology:   HC Plate yes  Wheel chair: no  Lift: no  Bath chair:no    Self Help skills:   Feeding: feeds himself  Bathing - yes, needs help/full assistance  Oral hygiene - yes, needs help/full assistance  Toileting/incontinence? needs partial assist with wiping. Mother says this hasn't changed    Toilet training: yes  Constipation: no     Meds tried:  Sertraline, Lexapro - no improvement  Fluoxetine - mother doesn't remember  Venlafaxine - possible sleep issues  Seroquel - for sleep, no benefit  Olanzapine - no improvement    Review of Systems   Gen: No fever, fatigue or malaise  Nose: No nasal congestion  Mouth: No sore throat  Resp: No cough or wheezing  CVS: No chest pain or palpitations  GI: No stomach aches  Neuro: No headaches    Vitals:    07/22/25 1440   BP: 118/78   Pulse: 68   Resp: 18   Temp: 97.3 °F (36.3 °C)   Weight: 80.6 kg (177 lb 11.1 oz)   Height: 5' 9.17" (1.757 m)     Physical Exam:  General: Alert and cooperative. Very limited eye contact and said only "good", when asked how he is doing.  Skin: Warm, dry, no rash  Eye: Pupils are equal, round and reactive to light. Normal conjunctiva, no discharge.  Nose: No nasal discharge.  Mouth and throat: Oral mucosa moist. No pharyngeal erythema or exudate.  Respiratory: Lungs are clear to auscultation, breath sounds are equal  Cardiovascular: Regular rate and rhythm. No murmur.  Neurologic: Alert, no focal neurological deficit observed.    Assessment/Plan:  Autism spectrum disorder    ADHD (attention deficit hyperactivity disorder), combined type  Comments:  Good response to Vyvanse and Adderall IR  Orders:  -     cloNIDine (CATAPRES) 0.2 MG tablet; " TAKE 1 TO 2 TABLETS BY MOUTH AT BEDTIME AS NEEDED FOR SLEEP  Dispense: 60 tablet; Refill: 5  -     VYVANSE 70 mg capsule; Take 1 capsule (70 mg total) by mouth every morning.  Dispense: 30 capsule; Refill: 0  -     VYVANSE 70 mg capsule; Take 1 capsule (70 mg total) by mouth every morning.  Dispense: 30 capsule; Refill: 0  -     VYVANSE 70 mg capsule; Take 1 capsule (70 mg total) by mouth every morning.  Dispense: 30 capsule; Refill: 0  -     dextroamphetamine-amphetamine (ADDERALL) 30 mg Tab; Take 1 tablet (30 mg total) by mouth Daily. Take in afternoon. Family will pay, do not run insurance.  Dispense: 30 tablet; Refill: 0  -     dextroamphetamine-amphetamine (ADDERALL) 30 mg Tab; Take 1 tablet (30 mg total) by mouth Daily. Take in afternoon. Family will pay, do not run insurance.  Dispense: 30 tablet; Refill: 0  -     dextroamphetamine-amphetamine (ADDERALL) 30 mg Tab; Take 1 tablet (30 mg total) by mouth Daily. Take in afternoon. Family will pay, do not run insurance.  Dispense: 30 tablet; Refill: 0  -     dextroamphetamine-amphetamine (ADDERALL) 30 mg Tab; Take 1 tablet (30 mg total) by mouth 2 (two) times daily. Take in AM and at noon/lunch  Dispense: 60 tablet; Refill: 0  -     dextroamphetamine-amphetamine (ADDERALL) 30 mg Tab; Take 1 tablet (30 mg total) by mouth 2 (two) times daily. Take in AM and at noon/lunch  Dispense: 60 tablet; Refill: 0  -     dextroamphetamine-amphetamine (ADDERALL) 30 mg Tab; Take 1 tablet (30 mg total) by mouth 2 (two) times daily. Take in AM and at noon/lunch  Dispense: 60 tablet; Refill: 0    Persistent disorder of initiating or maintaining sleep  Comments:  Good response to Clonidine and Clonazepam  Orders:  -     cloNIDine (CATAPRES) 0.2 MG tablet; TAKE 1 TO 2 TABLETS BY MOUTH AT BEDTIME AS NEEDED FOR SLEEP  Dispense: 60 tablet; Refill: 5  -     clonazePAM (KLONOPIN) 2 MG Tab; Take 1 tablet (2 mg total) by mouth every evening. For sleep  Dispense: 30 tablet; Refill:  3    Continue current medications as directed  Follow up 3 months